# Patient Record
Sex: FEMALE | Race: BLACK OR AFRICAN AMERICAN | URBAN - METROPOLITAN AREA
[De-identification: names, ages, dates, MRNs, and addresses within clinical notes are randomized per-mention and may not be internally consistent; named-entity substitution may affect disease eponyms.]

---

## 2024-02-27 SDOH — HEALTH STABILITY: PHYSICAL HEALTH: ON AVERAGE, HOW MANY MINUTES DO YOU ENGAGE IN EXERCISE AT THIS LEVEL?: 30 MIN

## 2024-02-27 SDOH — HEALTH STABILITY: PHYSICAL HEALTH: ON AVERAGE, HOW MANY DAYS PER WEEK DO YOU ENGAGE IN MODERATE TO STRENUOUS EXERCISE (LIKE A BRISK WALK)?: 0 DAYS

## 2024-02-29 ENCOUNTER — OFFICE VISIT (OUTPATIENT)
Age: 54
End: 2024-02-29
Payer: COMMERCIAL

## 2024-02-29 DIAGNOSIS — R29.898 RUE WEAKNESS: ICD-10-CM

## 2024-02-29 DIAGNOSIS — M79.641 PAIN OF RIGHT HAND: ICD-10-CM

## 2024-02-29 DIAGNOSIS — M54.12 CERVICAL RADICULOPATHY: Primary | ICD-10-CM

## 2024-02-29 PROCEDURE — 72040 X-RAY EXAM NECK SPINE 2-3 VW: CPT | Performed by: ORTHOPAEDIC SURGERY

## 2024-02-29 PROCEDURE — 99203 OFFICE O/P NEW LOW 30 MIN: CPT | Performed by: ORTHOPAEDIC SURGERY

## 2024-02-29 RX ORDER — BACLOFEN 10 MG/1
10 TABLET ORAL 3 TIMES DAILY
Qty: 90 TABLET | Refills: 0 | Status: SHIPPED | OUTPATIENT
Start: 2024-02-29

## 2024-02-29 RX ORDER — MELOXICAM 15 MG/1
15 TABLET ORAL DAILY
Qty: 30 TABLET | Refills: 3 | Status: SHIPPED | OUTPATIENT
Start: 2024-02-29

## 2024-02-29 NOTE — PROGRESS NOTES
Edema.   Pertinent positive as above in HPI. All others were negative    PHYSICAL EXAM:   There were no vitals taken for this visit.  The patient is a well-developed, well-nourished female   in no acute distress.  The patient is alert and oriented times three.  The patient is alert and oriented times three. Mood and affect are normal.  LYMPHATIC: lymph nodes are not enlarged and are within normal limits  SKIN: normal in color and non tender to palpation. There are no bruises or abrasions noted.   NEUROLOGICAL: Motor sensory exam is within normal limits. Reflexes are equal bilaterally. There is normal sensation to pinprick and light touch  MUSCULOSKELETAL:  Examination Right shoulder   Skin Intact   AC joint tenderness -   Biceps tenderness -   Forward flexion/Elevation    Active abduction    Glenohumeral abduction 90   External rotation ROM 90   Internal rotation ROM 70   Apprehension -   Julien’s Relocation -   Jerk -   Load and Shift -   [place]bri[place] -   Speeds -   Impingement sign -   Supraspinatus/Empty Can -, 5/5   External Rotation Strength -, 5/5   Lift Off/Belly Press -, 5/5   Neurovascular Intact       IMAGING: XR of the cervical spine with 2-3 views obtained in office dated 02/29/2024 reviewed and read by Dr. Beck: Loss of cervical lordosis with spondylitic changes at C4-5 and C5-6    IMPRESSION:      ICD-10-CM    1. Cervical radiculopathy  M54.12 AMB POC XRAY, SPINE, CERVICAL; 2 OR 3      2. RUE weakness  R29.898       3. Pain of right hand  M79.641            PLAN:   1. Pt presented today with right upper extremity weakness that radiates down her hand. I will refer her to undergo a nerve conduction study of her RUE for further assessment. I will also prescribe her Mobic 15 mg QD and Baclofen 10 mg QD. Additionally, I will refer her to Dr. Barriga for evaluation and treatment of her hand symptoms.   Risk factors include: N/A  2. No cortisone injection indicated today  3. No Physical/Occupational

## 2024-04-05 ENCOUNTER — PROCEDURE VISIT (OUTPATIENT)
Age: 54
End: 2024-04-05
Payer: COMMERCIAL

## 2024-04-05 VITALS
BODY MASS INDEX: 38.8 KG/M2 | HEART RATE: 111 BPM | HEIGHT: 68 IN | WEIGHT: 256 LBS | OXYGEN SATURATION: 96 % | DIASTOLIC BLOOD PRESSURE: 79 MMHG | SYSTOLIC BLOOD PRESSURE: 122 MMHG

## 2024-04-05 DIAGNOSIS — G56.01 CARPAL TUNNEL SYNDROME OF RIGHT WRIST: ICD-10-CM

## 2024-04-05 DIAGNOSIS — R94.131 ABNORMAL EMG: Primary | ICD-10-CM

## 2024-04-05 DIAGNOSIS — M79.641 PAIN OF RIGHT HAND: ICD-10-CM

## 2024-04-05 PROCEDURE — 95886 MUSC TEST DONE W/N TEST COMP: CPT | Performed by: PHYSICAL MEDICINE & REHABILITATION

## 2024-04-05 PROCEDURE — 95909 NRV CNDJ TST 5-6 STUDIES: CPT | Performed by: PHYSICAL MEDICINE & REHABILITATION

## 2024-04-05 NOTE — PROGRESS NOTES
Latency Amplitude F-Lat Segment Distance CV Comment   Site (ms) Norm (mV) Norm (ms)  (cm) (m/s) Norm    Right Median (APB) Motor   Wrist 5.2  < 4.4 4.1  > 4.2  Wrist-APB 8      Elbow 9.6 - 4.1 -  Elbow-Wrist 23 52  > 51    Right Ulnar (ADM) Motor   Wrist 3.3  < 3.7 8.5  > 7.9  Wrist-ADM 8      Bel Elbow 6.7 - 8.6 -  Bel Elbow-Wrist 19 56  > 52    Abv Elbow 8.4 - 8.0 -  Abv Elbow-Bel Elbow 10 59  > 43      Sensory Sites       Latency (Onset) Latency (Peak)  Amplitude (O-P) Segment Distance CV (Onset) Comment   Site ms Norm (ms) Norm µV Norm  mm m/s Norm    Right Median Sensory   Wrist-Dig II 3.9  < 3.3 4.9  < 4.0 7  > 7 Wrist-Dig II 14 36 -    Right Radial Sensory   Forearm-Wrist 1.55  < 2.2 2.2  < 2.8 26  > 7 Forearm-Wrist 10 65 -    Right Ulnar Sensory   Wrist-Dig V 3.0  < 3.1 3.5  < 4.0 10  > 7 Wrist-Dig V 14 47 -      EMG+     Side Muscle Nerve Root Ins Act Fibs Psw Fascics Other Amp Dur Poly Recrt Activation Comment Misc   Right Biceps Musculocut C5-C6 Nml Nml Nml Nml 0 Nml Nml 0 Nml Nml     Right Triceps Radial C6-C8 Nml Nml Nml Nml 0 Nml Nml 0 Nml Nml     Right Pronator Teres Median C6-C7 Nml Nml Nml Nml 0 Nml Nml 0 Nml Nml     Right FDI Median,  Ulnar C8-T1 Nml Nml Nml Nml 0 Nml Nml 0 Nml Nml     Right APB Median C8-T1 Nml Nml Nml Nml 0 Nml Nml 0 Nml Nml     Right Cervical Parasp (Upper) Rami C1-C3 Nml Nml Nml Nml 0          Right Cervical Parasp (Mid) Rami C4-C6 Nml Nml Nml Nml 0          Right Cervical Parasp (Lower) Rami C7-C8 Nml Nml Nml Nml 0                  Waveforms:    Motor         Sensory                 [facility]  OFFICE PROCEDURE PROGRESS NOTE        Chart reviewed for the following:   ICyrus, have reviewed the History, Physical and updated the Allergic reactions for Danni Ges     TIME OUT performed immediately prior to start of procedure:   Cyrus KAY, have performed the following reviews on Danni Taylor prior to the start of

## 2024-04-08 ENCOUNTER — OFFICE VISIT (OUTPATIENT)
Age: 54
End: 2024-04-08
Payer: COMMERCIAL

## 2024-04-08 VITALS — BODY MASS INDEX: 38.8 KG/M2 | WEIGHT: 256 LBS | HEIGHT: 68 IN

## 2024-04-08 DIAGNOSIS — G56.01 RIGHT CARPAL TUNNEL SYNDROME: Primary | ICD-10-CM

## 2024-04-08 DIAGNOSIS — M54.2 NECK PAIN: ICD-10-CM

## 2024-04-08 DIAGNOSIS — M65.311 TRIGGER THUMB OF RIGHT HAND: ICD-10-CM

## 2024-04-08 PROCEDURE — 99214 OFFICE O/P EST MOD 30 MIN: CPT | Performed by: ORTHOPAEDIC SURGERY

## 2024-04-08 NOTE — PROGRESS NOTES
Danni Taylor is a 53 y.o. female right handed.  Worker's Compensation and legal considerations: none    Chief Complaint   Patient presents with    Hand Pain     Right       Pain Score:   4    HPI: Patient presents today with a history of right hand numbness and tingling as well as neck pain.  She has recently had an EMG positive for carpal tunnel syndrome.    Date of onset: Indeterminate  Injury: No  Prior Treatment:  No    ROS: Review of Systems - General ROS: negative except HPI    History reviewed. No pertinent past medical history.    No past surgical history on file.     Current Outpatient Medications   Medication Sig Dispense Refill    glipiZIDE (GLUCOTROL XL) 10 MG extended release tablet Take 1 tablet by mouth daily      metFORMIN (GLUCOPHAGE) 1000 MG tablet Take 1 tablet by mouth 2 times daily      OZEMPIC, 1 MG/DOSE, 4 MG/3ML SOPN       escitalopram (LEXAPRO) 10 MG tablet Take 1 tablet by mouth daily      vitamin D (ERGOCALCIFEROL) 1.25 MG (83694 UT) CAPS capsule TAKE 1 CAPSULE BY MOUTH 1 TIME WEEKLY FOR 12 WEEKS      Continuous Blood Gluc Sensor ([place] [employer] 3 SENSOR) [facility] [place] USE AS DIRECTED FOR BLOOD GLUCOSE MONITORING      pioglitazone (ACTOS) 30 MG tablet Take 1 tablet by mouth daily       No current facility-administered medications for this visit.       Allergies   Allergen Reactions    Sulfa Antibiotics Rash and Other (See Comments)         Ht 1.727 m (5' 8\")   Wt 116.1 kg (256 lb)   BMI 38.92 kg/m²   Physical Exam  Vitals and nursing note reviewed.   Constitutional:       General: She is not in acute distress.     Appearance: Normal appearance. She is not ill-appearing.   Cardiovascular:      Pulses: Normal pulses.   Pulmonary:      Effort: Pulmonary effort is normal. No respiratory distress.   Musculoskeletal:         General: Tenderness present. No swelling, deformity or signs of injury. Normal range of motion.      Cervical back: Normal range of motion and neck supple. Tenderness